# Patient Record
Sex: MALE | Race: WHITE | NOT HISPANIC OR LATINO | Employment: OTHER | URBAN - METROPOLITAN AREA
[De-identification: names, ages, dates, MRNs, and addresses within clinical notes are randomized per-mention and may not be internally consistent; named-entity substitution may affect disease eponyms.]

---

## 2017-01-01 ENCOUNTER — TELEPHONE (OUTPATIENT)
Dept: ONCOLOGY | Facility: CLINIC | Age: 82
End: 2017-01-01

## 2017-01-01 ENCOUNTER — OFFICE VISIT (OUTPATIENT)
Dept: ONCOLOGY | Facility: CLINIC | Age: 82
End: 2017-01-01

## 2017-01-01 VITALS
TEMPERATURE: 98.1 F | SYSTOLIC BLOOD PRESSURE: 145 MMHG | BODY MASS INDEX: 24.88 KG/M2 | RESPIRATION RATE: 18 BRPM | HEIGHT: 69 IN | DIASTOLIC BLOOD PRESSURE: 64 MMHG | WEIGHT: 168 LBS | HEART RATE: 60 BPM

## 2017-01-01 DIAGNOSIS — C64.9 METASTATIC RENAL CELL CARCINOMA, UNSPECIFIED LATERALITY (HCC): Primary | ICD-10-CM

## 2017-01-01 LAB
BASOPHILS # BLD AUTO: 0 X10E3/UL (ref 0–0.2)
BASOPHILS NFR BLD AUTO: 0 %
EOSINOPHIL # BLD AUTO: 0.3 X10E3/UL (ref 0–0.4)
EOSINOPHIL NFR BLD AUTO: 4 %
ERYTHROCYTE [DISTWIDTH] IN BLOOD BY AUTOMATED COUNT: 17.7 % (ref 12.3–15.4)
HCT VFR BLD AUTO: 37.6 % (ref 37.5–51)
HGB BLD-MCNC: 12.2 G/DL (ref 12.6–17.7)
IMM GRANULOCYTES # BLD: 0 X10E3/UL (ref 0–0.1)
IMM GRANULOCYTES NFR BLD: 0 %
LYMPHOCYTES # BLD AUTO: 1.4 X10E3/UL (ref 0.7–3.1)
LYMPHOCYTES NFR BLD AUTO: 24 %
MCH RBC QN AUTO: 25.2 PG (ref 26.6–33)
MCHC RBC AUTO-ENTMCNC: 32.4 G/DL (ref 31.5–35.7)
MCV RBC AUTO: 78 FL (ref 79–97)
MONOCYTES # BLD AUTO: 0.5 X10E3/UL (ref 0.1–0.9)
MONOCYTES NFR BLD AUTO: 9 %
NEUTROPHILS # BLD AUTO: 3.7 X10E3/UL (ref 1.4–7)
NEUTROPHILS NFR BLD AUTO: 63 %
PLATELET # BLD AUTO: 239 X10E3/UL (ref 150–379)
RBC # BLD AUTO: 4.84 X10E6/UL (ref 4.14–5.8)
WBC # BLD AUTO: 5.9 X10E3/UL (ref 3.4–10.8)

## 2017-01-01 PROCEDURE — 99214 OFFICE O/P EST MOD 30 MIN: CPT | Performed by: INTERNAL MEDICINE

## 2017-01-01 RX ORDER — HYDROCODONE BITARTRATE AND ACETAMINOPHEN 10; 325 MG/1; MG/1
TABLET ORAL
Qty: 150 TABLET | Refills: 0 | Status: SHIPPED | OUTPATIENT
Start: 2017-01-01

## 2017-01-06 DIAGNOSIS — R91.8 LUNG MASS: Primary | ICD-10-CM

## 2017-01-12 ENCOUNTER — HOSPITAL ENCOUNTER (OUTPATIENT)
Dept: CT IMAGING | Facility: HOSPITAL | Age: 82
Discharge: HOME | End: 2017-01-12
Admitting: NURSE PRACTITIONER

## 2017-01-12 DIAGNOSIS — R91.8 LUNG MASS: ICD-10-CM

## 2017-01-12 PROCEDURE — 71250 CT THORAX DX C-: CPT

## 2017-03-23 PROBLEM — C64.9 METASTATIC RENAL CELL CARCINOMA (HCC): Status: ACTIVE | Noted: 2017-03-23

## 2017-03-24 ENCOUNTER — CONSULT (OUTPATIENT)
Dept: ONCOLOGY | Facility: CLINIC | Age: 82
End: 2017-03-24

## 2017-03-24 VITALS
SYSTOLIC BLOOD PRESSURE: 148 MMHG | WEIGHT: 162 LBS | DIASTOLIC BLOOD PRESSURE: 61 MMHG | RESPIRATION RATE: 18 BRPM | HEART RATE: 63 BPM | BODY MASS INDEX: 23.99 KG/M2 | TEMPERATURE: 98.3 F | HEIGHT: 69 IN

## 2017-03-24 DIAGNOSIS — C64.9 METASTATIC RENAL CELL CARCINOMA, UNSPECIFIED LATERALITY (HCC): Primary | ICD-10-CM

## 2017-03-24 PROCEDURE — 99204 OFFICE O/P NEW MOD 45 MIN: CPT | Performed by: INTERNAL MEDICINE

## 2017-03-24 RX ORDER — PANTOPRAZOLE SODIUM 40 MG/1
TABLET, DELAYED RELEASE ORAL
COMMUNITY
Start: 2017-03-16

## 2017-03-24 RX ORDER — OXYCODONE HYDROCHLORIDE AND ACETAMINOPHEN 5; 325 MG/1; MG/1
TABLET ORAL
COMMUNITY
Start: 2017-03-16 | End: 2017-01-01

## 2017-03-24 NOTE — PROGRESS NOTES
CHIEF COMPLAINT: Metastatic renal cell carcinoma    REASON FOR REFERRAL: Same      RECORDS OBTAINED  Records of the patients history including those obtained from  Gilbert Mishra and Brennen Gloria as well as prior notes from pulmonary associates and lung tumor Board at [facility] were reviewed and summarized in detail.    HISTORY OF PRESENT ILLNESS:  The patient is a 81 y.o.  male, referred for metastatic renal cell carcinoma.  He had a nephrectomy in 2005.  He been doing well but in the last year he had lung as well as mediastinal abnormalities that pulmonary has been watching.  There've been subtle progression in the nodes in the lung and more recently an abnormality in the liver that he also felt a testicular abnormality.  Dr. Mishra performed an orchiectomy and found clear cell carcinoma.  He also been anemic with some GI bleeding and Dr. Gloria performed an EGD and found a low-grade carcinoid of the stomach.  He comes today for further discussions.  Other than mild fatigue associated with anemia from GI loss, he has no other complaints.    REVIEW OF SYSTEMS:  A 14 point review of systems was performed and is negative except as noted above.    Past Medical History:   Diagnosis Date   • BPH (benign prostatic hyperplasia)    • GERD (gastroesophageal reflux disease)    • Herpes dermatitis     PATIENT CURRENTLY HAS BLISTERS ON LIPS   • History of kidney cancer    • Hypertension    • Lung nodule    • Occult GI bleeding     NO KNOWN CAUSE PER PATIENT     Past Surgical History:   Procedure Laterality Date   • BRONCHOSCOPY N/A 7/15/2016    Procedure: BRONCHOSCOPY WITH NAVIGATION EBUS WITH FLOURO;  Surgeon: Agapito Alves MD;  Location: [facility] ENDOSCOPY;  Service:    • COLONOSCOPY     • NEPHRECTOMY         Current Outpatient Prescriptions on File Prior to Visit   Medication Sig Dispense Refill   • DIPHENHYDRAMINE HCL, SLEEP, PO Take 30 mL by mouth at night as needed.     • doxazosin (CARDURA) 4 MG tablet 4 mg  "every night.     • dry mouth gel (BIOTENE ORALBALANCE) gel Apply 1 application to the mouth or throat every night.     • finasteride (PROSCAR) 5 MG tablet Take 5 mg by mouth daily.     • fluticasone (FLONASE) 50 MCG/ACT nasal spray 1 spray daily.     • Multiple Vitamins-Minerals (MULTIVITAMIN & MINERAL PO) Take 1 tablet by mouth daily.     • Omega-3 Fatty Acids (FISH OIL) 1000 MG capsule capsule Take 1,000 mg by mouth daily with breakfast.     • Pseudoeph-Doxylamine-DM-APAP (NYQUIL PO) Take 30 mL by mouth as needed (COUGH, CONGESTION).       No current facility-administered medications on file prior to visit.        Allergies   Allergen Reactions   • Codeine Itching       Social History     Social History   • Marital status:      Spouse name: N/A   • Number of children: N/A   • Years of education: N/A     Social History Main Topics   • Smoking status: Former Smoker     Packs/day: 0.50     Years: 15.00     Types: Cigarettes     Quit date: 7/14/1972   • Smokeless tobacco: Current User     Types: Chew   • Alcohol use None   • Drug use: No   • Sexual activity: Defer     Other Topics Concern   • None     Social History Narrative       History reviewed. No pertinent family history.    PHYSICAL EXAM:    /61  Pulse 63  Temp 98.3 °F (36.8 °C)  Resp 18  Ht 69\" (175.3 cm)  Wt 162 lb (73.5 kg)  BMI 23.92 kg/m2    ECOG: (1) Restricted in physically strenuous activity, ambulatory and able to do work of light nature  General: well appearing male in no acute distress  HEENT: sclera anicteric, oropharynx clear  Lymphatics: no cervical, supraclavicular, inguinal, or axillary adenopathy  Cardiovascular: regular rate and rhythm, no murmurs  Neck: Supple; No thyromegaly  Lungs: clear to auscultation bilaterally. No respiratory distress.   Abdomen: soft, nontender, nondistended.  No palpable organomegaly  Extremities: no cyanosis, clubbing, edema, or cords  Skin: no rashes, lesions, bruising, or petechiae  Neuro: Alert " and oriented x 4; Moving all extremities.  Psych: No anxiety or depression    No visits with results within 6 Week(s) from this visit.  Latest known visit with results is:    Admission on 07/15/2016, Discharged on 07/15/2016   Component Date Value Ref Range Status   • Case Report 07/15/2016    Final                    Value:Surgical Pathology Report                         Case: [id number]                                  Authorizing Provider:  Agapito Swan          Collected:           07/15/2016 08:18 AM                                 MD Long                                                                 Ordering Location:     [facility]   Received:            07/15/2016 09:39 AM                                 ENDO SUITES                                                                  Pathologist:           Toni Winters MD                                                            Intraop:               Toni Winters MD                                                            Specimens:   1) - Lung, Right Middle Lobe, RML Orifice                                                           2) - Mediastinum, TBNA 4R                                                                           3) - Lung, Left Lower Lobe, LLL Mass                                                      • Clinical Information 07/15/2016    Final                    Value:This result contains rich text formatting which cannot be displayed here.   • Final Diagnosis 07/15/2016    Final                    Value:This result contains rich text formatting which cannot be displayed here.   • Intraoperative Consultation 07/15/2016    Final                    Value:This result contains rich text formatting which cannot be displayed here.   • Gross Description 07/15/2016    Final                    Value:This result contains rich text formatting which cannot be displayed here.   • Microscopic Description 07/15/2016    Final                     Value:This result contains rich text formatting which cannot be displayed here.   • Case Report 07/15/2016    Final                    Value:Non-gynecologic Cytology                          Case: [id number]                                  Authorizing Provider:  Agapito Swan          Collected:           07/15/2016 08:47 AM                                 MD Long                                                                 Ordering Location:     [facility]   Received:            07/15/2016 11:04 AM                                 ENDO SUITES                                                                  Pathologist:           Dimitris Finch MD                                                      Specimens:   1) - Mediastinum, TBNA 7                                                                            2) - Mediastinum, TBNA 4R                                                                           3) - Mediastinum, TBNA 11R                                                                          4) - Lung, Right Lower Lobe, RLL Mass                                                                                         5) - Bronchus                                                                                       6) - Lung, Left Lower Lobe, LLL Brushing                                                            7) - Lung, Left Lower Lobe, LLL Mass                                                      • Final Diagnosis 07/15/2016    Final                    Value:This result contains rich text formatting which cannot be displayed here.       Assessment/Plan     1. Metastatic renal cell carcinoma to testicle orchiectomy-proven and probably liver and lung  2. Low-grade carcinoid of the stomach    Discussion: We had a lengthy discussion today regarding this complex decision tree.  He is a fairly healthy 81-year-old.  Nonetheless, he understands that we  have no curative options and there is a high likelihood that the other lesions that we see in the liver and lung or metastatic.  Part of the question would be whether this is related to the carcinoid in the stomach though I think that's less likely but I would need to get tissue from the liver just to be sure what we are dealing with in that location before making decisions.  Obviously the testicle has been removed and that would not be a measurable lesion.  Having said that, when he understands that what ever we do is palliative treatment at best and given that overall he has a pretty good quality of life presently.  And, given that his disease has been relatively slowly progressing under a watchful waiting plan by pulmonary since the summertime, he has opted for best supportive care.  Presently he has relatively few symptoms and hence we will not get him with hospice.  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  I think that is a reasonable choice and I will support that decision.  His daughter who is a dialysis nurse completely agrees with that.  His wife is also in agreement.  We will get his blood count now and transfuse as need be and other than that we plan on no further labs or scans or tests except when necessary transfusion and to workup the symptoms whereby we might radiate to painful bone lesion, remove pleural fluid if he was short of breath, treated pulmonary embolus, treated an infection, etc.    Errors in dictation may reflect use of voice recognition software and not all errors in transcription may have been detected prior to signing.    Chucky Roland MD    3/24/2017

## 2017-05-22 ENCOUNTER — RESULTS ENCOUNTER (OUTPATIENT)
Dept: ONCOLOGY | Facility: CLINIC | Age: 82
End: 2017-05-22

## 2017-05-22 DIAGNOSIS — C64.9 METASTATIC RENAL CELL CARCINOMA, UNSPECIFIED LATERALITY (HCC): ICD-10-CM

## 2017-05-25 ENCOUNTER — OFFICE VISIT (OUTPATIENT)
Dept: ONCOLOGY | Facility: CLINIC | Age: 82
End: 2017-05-25

## 2017-05-25 VITALS
TEMPERATURE: 97.9 F | BODY MASS INDEX: 24.44 KG/M2 | WEIGHT: 165 LBS | SYSTOLIC BLOOD PRESSURE: 153 MMHG | RESPIRATION RATE: 16 BRPM | HEIGHT: 69 IN | DIASTOLIC BLOOD PRESSURE: 61 MMHG | HEART RATE: 60 BPM

## 2017-05-25 DIAGNOSIS — C64.9 METASTATIC RENAL CELL CARCINOMA, UNSPECIFIED LATERALITY (HCC): Primary | ICD-10-CM

## 2017-05-25 PROCEDURE — 99213 OFFICE O/P EST LOW 20 MIN: CPT | Performed by: NURSE PRACTITIONER

## 2017-07-25 ENCOUNTER — TELEPHONE (OUTPATIENT)
Dept: ONCOLOGY | Facility: CLINIC | Age: 82
End: 2017-07-25

## 2017-07-25 NOTE — TELEPHONE ENCOUNTER
----- Message from Sarthak Cazares sent at 7/25/2017 10:33 AM EDT -----  Regarding: ALVARADO earlier appt sarah, and talk about new sypmtoms  Contact: [phone]  Denisse, patients daughter was wanting to talk about new symptoms and see if they needed to get in earlier than August 17.

## 2017-07-25 NOTE — TELEPHONE ENCOUNTER
Spoke with Denisse.  Patient is having more pain, but refuses to take anything for it.  He is wondering if he needs to have scans to see if he has had progression.  I explained we do not usually do scans if we do not intend to do any treatment, but earlier appointment given to discuss.  I asked Sarthak to schedule appointment on 8-3-17 @ 9:00 with Dr. Roland in [place].  Denisse is aware of appointment info and will tell him.

## 2017-07-29 LAB
BASOPHILS # BLD AUTO: 0 X10E3/UL (ref 0–0.2)
BASOPHILS NFR BLD AUTO: 0 %
EOSINOPHIL # BLD AUTO: 0.2 X10E3/UL (ref 0–0.4)
EOSINOPHIL NFR BLD AUTO: 4 %
ERYTHROCYTE [DISTWIDTH] IN BLOOD BY AUTOMATED COUNT: 16.2 % (ref 12.3–15.4)
HCT VFR BLD AUTO: 36.1 % (ref 37.5–51)
HGB BLD-MCNC: 11.3 G/DL (ref 12.6–17.7)
IMM GRANULOCYTES # BLD: 0 X10E3/UL (ref 0–0.1)
IMM GRANULOCYTES NFR BLD: 0 %
LYMPHOCYTES # BLD AUTO: 1.4 X10E3/UL (ref 0.7–3.1)
LYMPHOCYTES NFR BLD AUTO: 25 %
MCH RBC QN AUTO: 23.5 PG (ref 26.6–33)
MCHC RBC AUTO-ENTMCNC: 31.3 G/DL (ref 31.5–35.7)
MCV RBC AUTO: 75 FL (ref 79–97)
MONOCYTES # BLD AUTO: 0.4 X10E3/UL (ref 0.1–0.9)
MONOCYTES NFR BLD AUTO: 8 %
NEUTROPHILS # BLD AUTO: 3.3 X10E3/UL (ref 1.4–7)
NEUTROPHILS NFR BLD AUTO: 63 %
PLATELET # BLD AUTO: 233 X10E3/UL (ref 150–379)
RBC # BLD AUTO: 4.8 X10E6/UL (ref 4.14–5.8)
WBC # BLD AUTO: 5.4 X10E3/UL (ref 3.4–10.8)

## 2017-08-03 ENCOUNTER — OFFICE VISIT (OUTPATIENT)
Dept: ONCOLOGY | Facility: CLINIC | Age: 82
End: 2017-08-03

## 2017-08-03 VITALS
DIASTOLIC BLOOD PRESSURE: 66 MMHG | HEIGHT: 69 IN | RESPIRATION RATE: 18 BRPM | BODY MASS INDEX: 24.59 KG/M2 | SYSTOLIC BLOOD PRESSURE: 142 MMHG | WEIGHT: 166 LBS | TEMPERATURE: 97.4 F | HEART RATE: 62 BPM

## 2017-08-03 DIAGNOSIS — C64.9 METASTATIC RENAL CELL CARCINOMA, UNSPECIFIED LATERALITY (HCC): Primary | ICD-10-CM

## 2017-08-03 PROCEDURE — 99214 OFFICE O/P EST MOD 30 MIN: CPT | Performed by: INTERNAL MEDICINE

## 2017-08-03 RX ORDER — ONDANSETRON 4 MG/1
TABLET, FILM COATED ORAL
COMMUNITY
Start: 2017-07-06

## 2017-08-03 RX ORDER — HYDROCODONE BITARTRATE AND ACETAMINOPHEN 5; 325 MG/1; MG/1
1 TABLET ORAL EVERY 6 HOURS PRN
Qty: 100 TABLET | Refills: 0 | Status: SHIPPED | OUTPATIENT
Start: 2017-08-03 | End: 2017-01-01

## 2017-08-03 NOTE — PROGRESS NOTES
CHIEF COMPLAINT: Management of metastatic renal cell carcinoma with low-grade carcinoid of the stomach    Problem List:  Oncology/Hematology History    1.  Metastatic renal cell carcinoma  A) Patient had a nephrectomy in 2005 and had been doing well up until last year when routine scanning noted mediastinal abnormalities that have been followed by pulmonology.  There have been a subtle progression in the nodes in the lung and more recently noted an abnormality in the liver and also abnormality in the left testicle.  He underwent left orchiectomy under the care of Dr. Gilbert Mishra, pathology showed clear cell carcinoma.  He also had been anemic with history of GI bleeding, EGD performed by Dr. Gloria revealed a low-grade carcinoid of the stomach.  Decision was made at consult March 2017 to not pursue any further aggressive therapies and patient has opted for best supportive care.        Metastatic renal cell carcinoma     Initial Diagnosis     Metastatic renal cell carcinoma            HISTORY OF PRESENT ILLNESS:  The patient is a 81 y.o. male, here for follow up on management of Metastatic renal cell carcinoma with low-grade carcinoid of the stomach as well.  His washed out and fatigued.  Hemoglobin currently is 11.3 with otherwise normal counts.  No noel GI bleeding or changes in the color caliber consistency of his stools.  Came to me sooner than his planned appointment for chest pain which on further questioning reveals he had bilateral nipple blood tenderness that has subsequently subsided and is no longer hurting him.  The only thing that bothers him is nocturnal right hip pain keeps him awake at night.  He otherwise ambulates well without pain throughout the day.  Past Medical History:   Diagnosis Date   • BPH (benign prostatic hyperplasia)    • GERD (gastroesophageal reflux disease)    • Herpes dermatitis     PATIENT CURRENTLY HAS BLISTERS ON LIPS   • History of kidney cancer    • Hypertension    • Lung  "nodule    • Occult GI bleeding     NO KNOWN CAUSE PER PATIENT     Past Surgical History:   Procedure Laterality Date   • BRONCHOSCOPY N/A 7/15/2016    Procedure: BRONCHOSCOPY WITH NAVIGATION EBUS WITH FLOURO;  Surgeon: Agapito Alves MD;  Location: [facility] ENDOSCOPY;  Service:    • COLONOSCOPY     • NEPHRECTOMY         Allergies   Allergen Reactions   • Codeine Itching       Family History and Social History reviewed and changed as necessary      REVIEW OF SYSTEM:   Review of Systems   Constitutional: Negative for appetite change, chills, diaphoresis, fatigue, fever and unexpected weight change.   HENT:   Negative for mouth sores, sore throat and trouble swallowing.    Eyes: Negative for icterus.   Respiratory: Negative for cough, hemoptysis and shortness of breath.    Cardiovascular: Negative for chest pain, leg swelling and palpitations.   Gastrointestinal: Negative for abdominal distention, abdominal pain, blood in stool, constipation, diarrhea, nausea and vomiting.   Endocrine: Negative for hot flashes.   Genitourinary: Negative for bladder incontinence, difficulty urinating, dysuria, frequency and hematuria.    Musculoskeletal: Negative for gait problem, neck pain and neck stiffness.   Skin: Negative for rash.   Neurological: Negative for dizziness, gait problem, headaches, light-headedness and numbness.   Hematological: Negative for adenopathy. Does not bruise/bleed easily.   Psychiatric/Behavioral: Negative for depression. The patient is not nervous/anxious.    All other systems reviewed and are negative.       PHYSICAL EXAM    Vitals:    08/03/17 0859   BP: 142/66   Pulse: 62   Resp: 18   Temp: 97.4 °F (36.3 °C)   Weight: 166 lb (75.3 kg)   Height: 69\" (175.3 cm)     Constitutional: Appears well-developed and well-nourished. No distress.   ECOG: (0) Fully active, able to carry on all predisease performance without restriction  HENT:   Head: Normocephalic.   Mouth/Throat: Oropharynx is clear and " moist.   Eyes: Conjunctivae are normal. Pupils are equal, round, and reactive to light. No scleral icterus.   Neck: Neck supple. No JVD present. No thyromegaly present.   Cardiovascular: Normal rate, regular rhythm and normal heart sounds.    Pulmonary/Chest: Breath sounds normal. No respiratory distress.  prominent bilateral nipple buds  Abdominal: Soft. Exhibits no distension and no mass. There is no hepatosplenomegaly. There is no tenderness. There is no rebound and no guarding.   Musculoskeletal:Exhibits no edema, tenderness or deformity.   Neurological: Alert and oriented to person, place, and time. Exhibits normal muscle tone.   Skin: No ecchymosis, no petechiae and no rash noted. Not diaphoretic. No cyanosis. Nails show no clubbing.   Psychiatric: Normal mood and affect.   Vitals reviewed.      Orders Only on 07/28/2017   Component Date Value Ref Range Status   • WBC 07/28/2017 5.4  3.4 - 10.8 x10E3/uL Final   • RBC 07/28/2017 4.80  4.14 - 5.80 x10E6/uL Final   • Hemoglobin 07/28/2017 11.3* 12.6 - 17.7 g/dL Final   • Hematocrit 07/28/2017 36.1* 37.5 - 51.0 % Final   • MCV 07/28/2017 75* 79 - 97 fL Final   • MCH 07/28/2017 23.5* 26.6 - 33.0 pg Final   • MCHC 07/28/2017 31.3* 31.5 - 35.7 g/dL Final   • RDW 07/28/2017 16.2* 12.3 - 15.4 % Final   • Platelets 07/28/2017 233  150 - 379 x10E3/uL Final   • Neutrophil Rel % 07/28/2017 63  % Final   • Lymphocyte Rel % 07/28/2017 25  % Final   • Monocyte Rel % 07/28/2017 8  % Final   • Eosinophil Rel % 07/28/2017 4  % Final   • Basophil Rel % 07/28/2017 0  % Final   • Neutrophils Absolute 07/28/2017 3.3  1.4 - 7.0 x10E3/uL Final   • Lymphocytes Absolute 07/28/2017 1.4  0.7 - 3.1 x10E3/uL Final   • Monocytes Absolute 07/28/2017 0.4  0.1 - 0.9 x10E3/uL Final   • Eosinophils Absolute 07/28/2017 0.2  0.0 - 0.4 x10E3/uL Final   • Basophils Absolute 07/28/2017 0.0  0.0 - 0.2 x10E3/uL Final   • Immature Granulocyte Rel % 07/28/2017 0  % Final   • Immature Grans Absolute  07/28/2017 0.0  0.0 - 0.1 x10E3/uL Final       Assessment/Plan     1.  Metastatic renal cell carcinoma  2. Low-grade carcinoid of the stomach  3. Right hip pain  4. Gynecomastia    Discussion: We again discussed the options of doing scans and going for treatment versus best supportive care.  After discussion he has opted for best supportive care without any testing except for reproducible localized pains for which we might give a few doses of radiation for palliation.  His chest pain was actually due to gynecomastia likely related to the orchiectomy.  He has not tried any ibuprofen for his hip pain for fear of hurting his kidney and he had even tried Tylenol yet.  We will start with Tylenol and I told him after that he could take a nighttime dose of ibuprofen with a fair degree of safety despite a creatinine of 1.8.  If that fails, I have printed a Norco 5/325 prescription which she can take every 6 hours as needed.  We'll see him back in 3 months for palliative care.  Overall he is doing wonderfully and does not appear to be declining.  He is not significantly anemic with hemoglobin over 11 and stable.  No further labs or scans except as symptoms dictate.  Chucky Roland MD    08/03/2017

## 2017-08-17 ENCOUNTER — TELEPHONE (OUTPATIENT)
Dept: ONCOLOGY | Facility: CLINIC | Age: 82
End: 2017-08-17

## 2017-08-17 NOTE — TELEPHONE ENCOUNTER
----- Message from Tabitha Ndiaye sent at 8/17/2017 10:50 AM EDT -----  Regarding: ALVARADO-PRESCRIPTION  Contact: [phone]  Sekou with [facility] pharmacy in [place] called needs clarification on the directions on the [place] [address]. Please call.

## 2017-09-07 ENCOUNTER — TELEPHONE (OUTPATIENT)
Dept: ONCOLOGY | Facility: CLINIC | Age: 82
End: 2017-09-07

## 2017-09-07 NOTE — TELEPHONE ENCOUNTER
Returned call to Denisse and left  stating patient could take benadryl 2 tabs at bedtime since benadryl is the active ingredient in tylenol PM.  It would be better than tylenol PM due to the tylenol content in his pain medication.

## 2017-09-07 NOTE — TELEPHONE ENCOUNTER
----- Message from Tabitha Ndiaye sent at 9/7/2017 10:23 AM EDT -----  Regarding: ALVARADO-MEDICATION  Contact: [phone]  Denisse patient's daughter called he is taking pain medication, but it keeps him awake could he take tylenol PM with pain medication to make him rest?

## 2017-10-27 NOTE — PROGRESS NOTES
CHIEF COMPLAINT: Management of metastatic renal cell carcinoma   Problem List:  Oncology/Hematology History    1.  Metastatic renal cell carcinoma  A) Patient had a nephrectomy in 2005 and had been doing well up until last year when routine scanning noted mediastinal abnormalities that have been followed by pulmonology.  There have been a subtle progression in the nodes in the lung and more recently noted an abnormality in the liver and also abnormality in the left testicle.  He underwent left orchiectomy under the care of Dr. Gilbert Mishra, pathology showed clear cell carcinoma.  He also had been anemic with history of GI bleeding, EGD performed by Dr. Gloria revealed a low-grade carcinoid of the stomach.  Decision was made at consult March 2017 to not pursue any further aggressive therapies and patient has opted for best supportive care.        Metastatic renal cell carcinoma     Initial Diagnosis     Metastatic renal cell carcinoma            HISTORY OF PRESENT ILLNESS:  The patient is a 82 y.o. male, here for follow up on management of Known metastatic renal cell carcinoma as outlined above.  Went to primary care recently with increasing shoulder and hip pain.  Right shoulder x-ray shows medial humeral lytic lesion and increased right hilar mass.  He is on 5/325 hydrocodone APAP she takes 2 every 4 hours with minimal relief.  He is had a nephrectomy.      Past Medical History:   Diagnosis Date   • BPH (benign prostatic hyperplasia)    • GERD (gastroesophageal reflux disease)    • Herpes dermatitis     PATIENT CURRENTLY HAS BLISTERS ON LIPS   • History of kidney cancer    • Hypertension    • Lung nodule    • Occult GI bleeding     NO KNOWN CAUSE PER PATIENT     Past Surgical History:   Procedure Laterality Date   • BRONCHOSCOPY N/A 7/15/2016    Procedure: BRONCHOSCOPY WITH NAVIGATION EBUS WITH FLOURO;  Surgeon: Agapito Alves MD;  Location: [facility] ENDOSCOPY;  Service:    • COLONOSCOPY     •  "NEPHRECTOMY         Allergies   Allergen Reactions   • Codeine Itching       Family History and Social History reviewed and changed as necessary      REVIEW OF SYSTEM:   Review of Systems   Constitutional: Negative for appetite change, chills, diaphoresis, fatigue, fever and unexpected weight change.   HENT:   Negative for mouth sores, sore throat and trouble swallowing.    Eyes: Negative for icterus.   Respiratory: Negative for cough, hemoptysis and shortness of breath.    Cardiovascular: Negative for chest pain, leg swelling and palpitations.   Gastrointestinal: Negative for abdominal distention, abdominal pain, blood in stool, constipation, diarrhea, nausea and vomiting.   Endocrine: Negative for hot flashes.   Genitourinary: Negative for bladder incontinence, difficulty urinating, dysuria, frequency and hematuria.    Musculoskeletal: Negative for gait problem, neck pain and neck stiffness.   Skin: Negative for rash.   Neurological: Negative for dizziness, gait problem, headaches, light-headedness and numbness.   Hematological: Negative for adenopathy. Does not bruise/bleed easily.   Psychiatric/Behavioral: Negative for depression. The patient is not nervous/anxious.    All other systems reviewed and are negative.       PHYSICAL EXAM    Vitals:    10/27/17 1326   BP: 145/64   Pulse: 60   Resp: 18   Temp: 98.1 °F (36.7 °C)   Weight: 168 lb (76.2 kg)   Height: 69\" (175.3 cm)     Constitutional: Appears well-developed and well-nourished. No distress.   ECOG: (3) Capable of limited self-care, confined to bed or chair > 50% of waking hours  HENT:   Head: Normocephalic.   Mouth/Throat: Oropharynx is clear and moist.   Eyes: Conjunctivae are normal. Pupils are equal, round, and reactive to light. No scleral icterus.   Neck: Neck supple. No JVD present. No thyromegaly present.   Cardiovascular: Normal rate, regular rhythm and normal heart sounds.    Pulmonary/Chest: Breath sounds normal. No respiratory distress. "   Abdominal: Soft. Exhibits no distension and no mass. There is no hepatosplenomegaly. There is no tenderness. There is no rebound and no guarding.   Musculoskeletal:Exhibits no edema, tenderness or deformity.   Neurological: Alert and oriented to person, place, and time. Exhibits normal muscle tone.   Skin: No ecchymosis, no petechiae and no rash noted. Not diaphoretic. No cyanosis. Nails show no clubbing.   Psychiatric: Normal mood and affect.   Vitals reviewed.      Orders Only on 10/23/2017   Component Date Value Ref Range Status   • WBC 10/23/2017 5.9  3.4 - 10.8 x10E3/uL Final   • RBC 10/23/2017 4.84  4.14 - 5.80 x10E6/uL Final   • Hemoglobin 10/23/2017 12.2* 12.6 - 17.7 g/dL Final   • Hematocrit 10/23/2017 37.6  37.5 - 51.0 % Final   • MCV 10/23/2017 78* 79 - 97 fL Final   • MCH 10/23/2017 25.2* 26.6 - 33.0 pg Final   • MCHC 10/23/2017 32.4  31.5 - 35.7 g/dL Final   • RDW 10/23/2017 17.7* 12.3 - 15.4 % Final   • Platelets 10/23/2017 239  150 - 379 x10E3/uL Final   • Neutrophil Rel % 10/23/2017 63  Not Estab. % Final   • Lymphocyte Rel % 10/23/2017 24  Not Estab. % Final   • Monocyte Rel % 10/23/2017 9  Not Estab. % Final   • Eosinophil Rel % 10/23/2017 4  Not Estab. % Final   • Basophil Rel % 10/23/2017 0  Not Estab. % Final   • Neutrophils Absolute 10/23/2017 3.7  1.4 - 7.0 x10E3/uL Final   • Lymphocytes Absolute 10/23/2017 1.4  0.7 - 3.1 x10E3/uL Final   • Monocytes Absolute 10/23/2017 0.5  0.1 - 0.9 x10E3/uL Final   • Eosinophils Absolute 10/23/2017 0.3  0.0 - 0.4 x10E3/uL Final   • Basophils Absolute 10/23/2017 0.0  0.0 - 0.2 x10E3/uL Final   • Immature Granulocyte Rel % 10/23/2017 0  Not Estab. % Final   • Immature Grans Absolute 10/23/2017 0.0  0.0 - 0.1 x10E3/uL Final       Assessment/Plan     1.  Metastatic renal cell carcinoma  2. 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 Progressive hilar mass    Discussion: I think that systemic therapy or palliative radiation would be reasonable.  On the other  hand he knows that these are not curative and he wishes to undergo no systemic or radiation there.  In any form.  He wishes to have no heroic measures and wants best supportive care with hospice and we will prescribed Norco 10/325 one to 2 every 4 hours as needed for pain and adjusted from there.  We'll see him back in 3 months but I would request that he had no further cardiac or any other workup or any other imaging without further discussion with me as the patient is unlikely to act on any findings… just as we are not acting on any of the current findings on recent imaging.  I would do no further lab testing either as again we would not transfuse or dialyze or do anything referable to any laboratory abnormalities in this setting pursuant to his requests.  He will see my nurse practitioner back the first part of the year to continue palliative care.      Chucky Roland MD    10/27/2017

## 2017-11-06 NOTE — TELEPHONE ENCOUNTER
I called in a prescription for bacitracin/polymyxin B opthalmic, 0.25-0.5 inch in affected eye 3-4/day to [facility] pharmacy.  Christine was going to get the other meds from her pharmacy for other symptoms.

## 2017-11-06 NOTE — TELEPHONE ENCOUNTER
----- Message from Lazara Wilkes RN sent at 11/6/2017  3:16 PM EST -----  Regarding: FW: ALVARADO - SYMPTOMS   Contact: [phone]      ----- Message -----     From: Roxanne Ann     Sent: 11/6/2017  10:49 AM       To: [facility]  Subject: ALVARADO - SYMPTOMS                                 MELISSA NORRIS WITH [facility] CALLED AND SAID THE PATIENT IS HAVING HICCUPS COMING AND GOING, CONGESTION AND COUGH, AND THEN RIGHT EYE LOOKS INFECTED AND DROOPING.     PLEASE CALL MELISSA BACK

## 2018-01-01 ENCOUNTER — TELEPHONE (OUTPATIENT)
Dept: ONCOLOGY | Facility: CLINIC | Age: 83
End: 2018-01-01

## 2018-03-30 NOTE — TELEPHONE ENCOUNTER
----- Message from Yvette Fink sent at 3/30/2018 10:02 AM EDT -----  Regarding: ALVARADO/HOSPICE CALL  SCAR FROM HOSPICE CALLED SAYING SHE THINKS THE PATIENT WOULD BENEFIT FROM A LOW DOSE STEROID. SHE HAS SEVERAL REASONS TO WAY SHE THINKS THIS AND ASKS THAT SHE BE CALLED BACK AT [phone]

## 2018-03-30 NOTE — TELEPHONE ENCOUNTER
Patient is resistant to taking pain medication and they would like to try a low dose steroid.  Ok per Chastity to try dexamethasone 4 mg po daily.  Pham notified.  She will start him on 2 mg daily and increase to 4 mg as tolerated.